# Patient Record
Sex: MALE | Race: WHITE | NOT HISPANIC OR LATINO
[De-identification: names, ages, dates, MRNs, and addresses within clinical notes are randomized per-mention and may not be internally consistent; named-entity substitution may affect disease eponyms.]

---

## 2020-02-06 ENCOUNTER — APPOINTMENT (OUTPATIENT)
Dept: OTOLARYNGOLOGY | Facility: CLINIC | Age: 75
End: 2020-02-06
Payer: MEDICARE

## 2020-02-06 VITALS
DIASTOLIC BLOOD PRESSURE: 75 MMHG | HEIGHT: 66 IN | HEART RATE: 85 BPM | BODY MASS INDEX: 25.23 KG/M2 | SYSTOLIC BLOOD PRESSURE: 144 MMHG | WEIGHT: 157 LBS

## 2020-02-06 DIAGNOSIS — Z82.49 FAMILY HISTORY OF ISCHEMIC HEART DISEASE AND OTHER DISEASES OF THE CIRCULATORY SYSTEM: ICD-10-CM

## 2020-02-06 DIAGNOSIS — Z86.79 PERSONAL HISTORY OF OTHER DISEASES OF THE CIRCULATORY SYSTEM: ICD-10-CM

## 2020-02-06 DIAGNOSIS — Z86.39 PERSONAL HISTORY OF OTHER ENDOCRINE, NUTRITIONAL AND METABOLIC DISEASE: ICD-10-CM

## 2020-02-06 DIAGNOSIS — H61.23 IMPACTED CERUMEN, BILATERAL: ICD-10-CM

## 2020-02-06 DIAGNOSIS — Z80.9 FAMILY HISTORY OF MALIGNANT NEOPLASM, UNSPECIFIED: ICD-10-CM

## 2020-02-06 DIAGNOSIS — Z78.9 OTHER SPECIFIED HEALTH STATUS: ICD-10-CM

## 2020-02-06 PROBLEM — Z00.00 ENCOUNTER FOR PREVENTIVE HEALTH EXAMINATION: Status: ACTIVE | Noted: 2020-02-06

## 2020-02-06 PROCEDURE — 92567 TYMPANOMETRY: CPT

## 2020-02-06 PROCEDURE — 99213 OFFICE O/P EST LOW 20 MIN: CPT | Mod: 25

## 2020-02-06 PROCEDURE — 92557 COMPREHENSIVE HEARING TEST: CPT

## 2020-02-06 PROCEDURE — G0268 REMOVAL OF IMPACTED WAX MD: CPT

## 2020-02-06 RX ORDER — MELOXICAM 7.5 MG/1
7.5 TABLET ORAL
Qty: 14 | Refills: 0 | Status: ACTIVE | COMMUNITY
Start: 2019-12-19

## 2020-02-06 RX ORDER — LOSARTAN POTASSIUM 100 MG/1
100 TABLET, FILM COATED ORAL
Qty: 90 | Refills: 0 | Status: ACTIVE | COMMUNITY
Start: 2019-10-18

## 2020-02-06 RX ORDER — ATORVASTATIN CALCIUM 40 MG/1
40 TABLET, FILM COATED ORAL
Qty: 90 | Refills: 0 | Status: ACTIVE | COMMUNITY
Start: 2019-06-10

## 2020-02-06 RX ORDER — LOSARTAN POTASSIUM 100 MG/1
TABLET, FILM COATED ORAL
Refills: 0 | Status: ACTIVE | COMMUNITY

## 2020-02-06 RX ORDER — FLUTICASONE PROPIONATE 50 UG/1
50 SPRAY, METERED NASAL DAILY
Qty: 1 | Refills: 3 | Status: ACTIVE | COMMUNITY
Start: 2020-02-06 | End: 1900-01-01

## 2020-02-06 RX ORDER — METOPROLOL SUCCINATE 25 MG/1
25 TABLET, EXTENDED RELEASE ORAL
Refills: 0 | Status: ACTIVE | COMMUNITY

## 2020-02-06 RX ORDER — METOPROLOL SUCCINATE 25 MG/1
25 TABLET, EXTENDED RELEASE ORAL
Qty: 90 | Refills: 0 | Status: ACTIVE | COMMUNITY
Start: 2019-11-18

## 2020-02-06 RX ORDER — TRAMADOL HYDROCHLORIDE 50 MG/1
50 TABLET, COATED ORAL
Qty: 15 | Refills: 0 | Status: ACTIVE | COMMUNITY
Start: 2019-12-19

## 2020-02-06 RX ORDER — DUTASTERIDE AND TAMSULOSIN HYDROCHLORIDE .5; .4 MG/1; MG/1
0.5-0.4 CAPSULE ORAL
Qty: 90 | Refills: 0 | Status: ACTIVE | COMMUNITY
Start: 2019-06-26

## 2020-02-06 RX ORDER — LOSARTAN POTASSIUM 50 MG/1
50 TABLET, FILM COATED ORAL
Qty: 90 | Refills: 0 | Status: ACTIVE | COMMUNITY
Start: 2019-12-12

## 2020-02-06 RX ORDER — ATORVASTATIN CALCIUM 80 MG/1
TABLET, FILM COATED ORAL
Refills: 0 | Status: ACTIVE | COMMUNITY

## 2020-02-06 RX ORDER — PANTOPRAZOLE 40 MG/1
40 TABLET, DELAYED RELEASE ORAL
Qty: 14 | Refills: 0 | Status: ACTIVE | COMMUNITY
Start: 2019-12-19

## 2020-02-06 RX ORDER — OXYCODONE AND ACETAMINOPHEN 5; 325 MG/1; MG/1
5-325 TABLET ORAL
Qty: 18 | Refills: 0 | Status: ACTIVE | COMMUNITY
Start: 2019-12-15

## 2020-02-06 NOTE — HISTORY OF PRESENT ILLNESS
[de-identified] : VERONICA FLORES is a 74 year patient Here for left ear fullness, eustachian dysfunction, and hearing loss. He had an upper respiratory tract infection about 3 weeks ago. He woke up with left-sided tinnitus and fullness. He has no otalgia or otorrhea. The cold improved. He has a mild cough. He has a history of a left sensorineural hearing loss which occurred in 2001. He was seeing Dr. Peña at the time. He had an MRI. He has not had a recent hearing test

## 2020-02-06 NOTE — CONSULT LETTER
[Dear  ___] : Dear  [unfilled], [Courtesy Letter:] : I had the pleasure of seeing your patient, [unfilled], in my office today. [Please see my note below.] : Please see my note below. [Consult Closing:] : Thank you very much for allowing me to participate in the care of this patient.  If you have any questions, please do not hesitate to contact me. [Sincerely,] : Sincerely, [FreeTextEntry3] : Reba Coley MD\par

## 2020-02-06 NOTE — ASSESSMENT
[FreeTextEntry1] : He has a long history of an asymmetric left sensorineural hearing loss. He had a recent upper respiratory tract infection. He had ear fullness. This is likely due to eustachian tube dysfunction and cerumen impaction. The ears were cleaned. There was no middle ear effusion.\par \par PLAN\par \par -findings and management options discussed in detail with the patient. \par -good aural hygiene\par -noise precautions\par -annual audiogram\par -consider HAE- he may benefit form a BiCros hearing aid\par -He may try nasal steroid spray and or decongestant. He could also consider a short burst of prednisone. It is unclear if he could have had another sudden sensorineural hearing loss as I do not have a prior audiogram. However, I think that is less likely.\par -follow up in 2 weeks if he is not better.  Otherwise followup in one year if he is doing well\par -call and return earlier if any concerns. \par

## 2020-02-10 ENCOUNTER — APPOINTMENT (OUTPATIENT)
Dept: OTOLARYNGOLOGY | Facility: CLINIC | Age: 75
End: 2020-02-10
Payer: SELF-PAY

## 2020-02-10 PROCEDURE — 92591 HEARING AID EXAMINATION & SELECTION BINAURAL: CPT | Mod: NC

## 2020-02-20 ENCOUNTER — APPOINTMENT (OUTPATIENT)
Dept: OTOLARYNGOLOGY | Facility: CLINIC | Age: 75
End: 2020-02-20
Payer: MEDICARE

## 2020-02-20 ENCOUNTER — APPOINTMENT (OUTPATIENT)
Dept: OTOLARYNGOLOGY | Facility: CLINIC | Age: 75
End: 2020-02-20
Payer: SELF-PAY

## 2020-02-20 VITALS
SYSTOLIC BLOOD PRESSURE: 155 MMHG | BODY MASS INDEX: 25.23 KG/M2 | DIASTOLIC BLOOD PRESSURE: 80 MMHG | HEIGHT: 66 IN | WEIGHT: 157 LBS

## 2020-02-20 DIAGNOSIS — H90.42 SENSORINEURAL HEARING LOSS, UNILATERAL, LEFT EAR, WITH UNRESTRICTED HEARING ON THE CONTRALATERAL SIDE: ICD-10-CM

## 2020-02-20 PROCEDURE — 92592: CPT | Mod: NC

## 2020-02-20 PROCEDURE — 99213 OFFICE O/P EST LOW 20 MIN: CPT

## 2020-02-20 PROCEDURE — 92557 COMPREHENSIVE HEARING TEST: CPT | Mod: LT

## 2020-02-20 PROCEDURE — 92567 TYMPANOMETRY: CPT | Mod: LT

## 2020-02-20 NOTE — HISTORY OF PRESENT ILLNESS
[de-identified] : 2/6/20- VERONICA FLORES is a 74 year patient Here for left ear fullness, eustachian dysfunction, and hearing loss. He had an upper respiratory tract infection about 3 weeks ago. He woke up with left-sided tinnitus and fullness. He has no otalgia or otorrhea. The cold improved. He has a mild cough. He has a history of a left sensorineural hearing loss which occurred in 2001. He was seeing Dr. Peña at the time. He had an MRI. He has not had a recent hearing test\par  [FreeTextEntry1] : \par 2/20/20- VERONICA FLORES Is here for followup for right ear fullness, hearing loss, and sensation of eustachian tube dysfunction. He does feel that his hearing intermittently improves when he presses on the tragus. He tried a nasal steroid spray but is not sure how much it helped. He is seeing the audiologist today.

## 2020-02-20 NOTE — ASSESSMENT
[FreeTextEntry1] : He has bilateral sensorineural hearing loss with asymmetry in the left ear which is unchanged. He intermittently feels better when he pushes on the tragus. He may have mild eustachian tube dysfunction. His ear was normal on exam.\par \par PLAN\par \par -findings and management options discussed in detail with the patient. \par -good aural hygiene\par -noise precautions\par -annual audiogram.  I will see if we can obtain a prior audiogram from his other ENT physician to see if the hearing has changed. Although he does feel it has intermittently improved since his last visit, audiogram is unchanged\par -he is seeing the audiologist today for evaluation\par -nasal steroid spray and or decongestant as needed. He could also consider trying a burst of oral steroids\par -he may consider NE/FL- he deferred today\par -follow up in one year if he is doing well\par -call and return earlier if any concerns. \par

## 2020-04-09 ENCOUNTER — APPOINTMENT (OUTPATIENT)
Dept: OTOLARYNGOLOGY | Facility: CLINIC | Age: 75
End: 2020-04-09

## 2021-08-19 ENCOUNTER — APPOINTMENT (OUTPATIENT)
Dept: OTOLARYNGOLOGY | Facility: CLINIC | Age: 76
End: 2021-08-19
Payer: MEDICARE

## 2021-08-19 DIAGNOSIS — H90.A21 SENSORINEURAL HEARING LOSS, UNILATERAL, RIGHT EAR, WITH RESTRICTED HEARING ON THE CONTRALATERAL SIDE: ICD-10-CM

## 2021-08-19 DIAGNOSIS — H90.A32 MIXED CONDUCTIVE AND SENSORINEURAL HEARING, UNILATERAL, LEFT EAR WITH RESTRICTED HEARING ON THE  CONTRALATERAL SIDE: ICD-10-CM

## 2021-08-19 DIAGNOSIS — H93.12 TINNITUS, LEFT EAR: ICD-10-CM

## 2021-08-19 DIAGNOSIS — H61.21 IMPACTED CERUMEN, RIGHT EAR: ICD-10-CM

## 2021-08-19 PROCEDURE — 31231 NASAL ENDOSCOPY DX: CPT

## 2021-08-19 PROCEDURE — 99214 OFFICE O/P EST MOD 30 MIN: CPT | Mod: 25

## 2021-08-19 PROCEDURE — 92567 TYMPANOMETRY: CPT

## 2021-08-19 PROCEDURE — 69210 REMOVE IMPACTED EAR WAX UNI: CPT

## 2021-08-19 PROCEDURE — 92557 COMPREHENSIVE HEARING TEST: CPT

## 2021-08-19 NOTE — ASSESSMENT
[FreeTextEntry1] : He has a history of bilateral sensorineural hearing loss with asymmetry in the left ear. For the past 10-14 days, he has had left ear pressure. He also had mild dizziness with Valsalva. He has had intermittent dizziness in the past. He had cerumen impaction on the right side which was removed. Nasal endoscopy and flexible laryngoscopy were unremarkable. Repeat audiogram showed bilateral sensorineural hearing loss with asymmetry in the left ear. It may be an mixed hearing loss although in the past it was sensorineural. He has type As tympanogram on that side. His word recognition was worse today in the left ear compared to his last test. I discussed whether or not he had a sudden change in the word recognition.  He does not think that is the case because he feels like he is hearing the same on that side. I discussed the possibility allergies for the ear pressure and dizziness including eustachian tube dysfunction, possible TMJ dysfunction, or inner ear disease such as superior semicircular canal dehiscence.\par \par PLAN\par \par -findings and management options discussed in detail with the patient and his wife\par -good aural hygiene\par --We will monitor his hearing\par -We will again try and obtain his old records.\par -I discussed whether or not he would benefit from a course of oral steroids. I would recommend them if there was a sudden change in the word recognition. He is going to hold off for now because he does not think that is the case.\par -He may try a nasal steroid sprays and decongestant for possible eustachian tube dysfunction\par -Treatment for TMJ dysfunction recommended\par -Repeat MRI of the IACs may be helpful. I have asked him to think this over\par -Followup in approximately 2 weeks. I will repeat his hearing test and see how he is feeling. We will then discuss further workup such as MRI. I would also consider VEMP testing. \par -call and return earlier if any concerns. \par

## 2021-08-19 NOTE — HISTORY OF PRESENT ILLNESS
[de-identified] : VERONICA FLORES is a 76 year patient With a history of bilateral sensorineural hearing loss with asymmetry in the left ear, eustachian tube dysfunction, and TMJ dysfunction. He has had a 10-14 day history of left ear pressure and pain.  He also has a little bit of dizziness when he Valsalva's. He denies otorrhea or tinnitus. He was not sick recently and has no nasal or throat symptoms. He flew recently had no trouble during the flight. 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 He has also had neck discomfort from sleeping on a new pillow\par \par ENT History\par He has had a left SNHLsince 2001. He was seeing Dr. Peña. He reportedly had an MRI. He has intermittent dizziness\par no history of recurrent ear infections, prior otologic surgery, ear/head trauma or noise exposure.

## 2021-09-02 ENCOUNTER — APPOINTMENT (OUTPATIENT)
Dept: OTOLARYNGOLOGY | Facility: CLINIC | Age: 76
End: 2021-09-02
Payer: MEDICARE

## 2021-09-02 VITALS — HEIGHT: 66 IN | TEMPERATURE: 98.5 F | BODY MASS INDEX: 23.63 KG/M2 | WEIGHT: 147 LBS

## 2021-09-02 DIAGNOSIS — H90.3 SENSORINEURAL HEARING LOSS, BILATERAL: ICD-10-CM

## 2021-09-02 DIAGNOSIS — M26.609 UNSPECIFIED TEMPOROMANDIBULAR JOINT DISORDER: ICD-10-CM

## 2021-09-02 DIAGNOSIS — H90.5 UNSPECIFIED SENSORINEURAL HEARING LOSS: ICD-10-CM

## 2021-09-02 DIAGNOSIS — H69.82 OTHER SPECIFIED DISORDERS OF EUSTACHIAN TUBE, LEFT EAR: ICD-10-CM

## 2021-09-02 PROCEDURE — 92567 TYMPANOMETRY: CPT

## 2021-09-02 PROCEDURE — 99214 OFFICE O/P EST MOD 30 MIN: CPT

## 2021-09-02 PROCEDURE — 92557 COMPREHENSIVE HEARING TEST: CPT

## 2021-09-02 RX ORDER — CALCIUM CARBONATE/VITAMIN D3 600 MG-10
TABLET ORAL
Refills: 0 | Status: ACTIVE | COMMUNITY

## 2021-09-02 RX ORDER — CHROMIUM 200 MCG
TABLET ORAL
Refills: 0 | Status: ACTIVE | COMMUNITY

## 2021-09-02 NOTE — HISTORY OF PRESENT ILLNESS
[de-identified] : VERONICA FLORES is a 76 year patient Here for followup for left ear symptoms. He has a history of bilateral sensorineural hearing loss with asymmetry in the left ear, eustachian tube dysfunction, and TMJ dysfunction. He continues to have some fullness in the left ear and hyperacusis.  He thinks it may be due to his neck. He continues to have neck discomfort which he believes is related to sleeping on a new pillow. We had discussed the possibility of a sudden hearing loss as his word recognition on the left side had decreased. He decided against oral steroids. \par \par ENT History\par He has had a left SNHLsince 2001. He was seeing Dr. Peña. He reportedly had an MRI. He has intermittent dizziness\par no history of recurrent ear infections, prior otologic surgery, ear/head trauma or noise exposure.\par NE/FL unremarkable

## 2021-09-02 NOTE — ASSESSMENT
[FreeTextEntry1] : He has bilateral sensorineural hearing loss with asymmetry in the left ear. He has been having left ear pressure and neck/ear discomfort. He has has occasional dizziness with Valsalva. He said he is had the dizziness for a while. There was concern of a decrease in the word recognition on the left side. Repeat testing today with [facility] showed 24% word Recognition which is essentially stable from 2020. However, he has had a decline in the word recognition with time. His ear pressure and discomfort may be due to TMJ dysfunction and musculoskeletal discomfort in his neck. He attributes this to sleeping on a new pillow.  I had discussed other possible etiologies but ear pressure and dizziness such as eustachian tube dysfunction and inner ear disease.\par \par PLAN\par \par -findings and management options discussed in detail with the patient and his wife.  \par -good aural hygiene\par -monitor hearing\par -I again asked him to try to get his prior records. \par - nasal steroid sprays and decongestant for eustachian tube dysfunction as needed. \par -Treatment for TMJ dysfunction recommended. warm compresses/heating pad and oral appliance. \par -I recommended that he see a neurologist for evaluation for cervical disc disease and the musculoskeletal discomfort. They are seeing a new PCP next week. I asked them to speak with her about this\par -I also spoke with him about obtaining imaging studies such as an MRI with contrast of the IACs or CT scan of the neck. He is not sure what he wants to do. I've asked them to think this over and call me if he wants to get one of the scans. \par -Consider further inner ear testing\par -he may consider a Cros hearing aid. \par -I asked him to call me after he sees the PCP and let me know what he would like to do. If he opts for observation, I will see him back in 3 months\par -call and return urgently if any worsening or persistent symptoms.

## 2021-09-07 ENCOUNTER — NON-APPOINTMENT (OUTPATIENT)
Age: 76
End: 2021-09-07

## 2021-09-07 DIAGNOSIS — R42 DIZZINESS AND GIDDINESS: ICD-10-CM

## 2021-09-14 ENCOUNTER — TRANSCRIPTION ENCOUNTER (OUTPATIENT)
Age: 76
End: 2021-09-14

## 2021-09-14 ENCOUNTER — RX RENEWAL (OUTPATIENT)
Age: 76
End: 2021-09-14

## 2021-09-14 RX ORDER — FLUTICASONE PROPIONATE 50 UG/1
50 SPRAY, METERED NASAL DAILY
Qty: 16 | Refills: 11 | Status: ACTIVE | COMMUNITY
Start: 2021-08-24 | End: 1900-01-01

## 2021-09-15 ENCOUNTER — APPOINTMENT (OUTPATIENT)
Dept: MRI IMAGING | Facility: HOSPITAL | Age: 76
End: 2021-09-15
Payer: MEDICARE

## 2021-09-15 ENCOUNTER — OUTPATIENT (OUTPATIENT)
Dept: OUTPATIENT SERVICES | Facility: HOSPITAL | Age: 76
LOS: 1 days | End: 2021-09-15
Payer: MEDICARE

## 2021-09-15 PROCEDURE — A9585: CPT

## 2021-09-15 PROCEDURE — 70553 MRI BRAIN STEM W/O & W/DYE: CPT | Mod: 26,MH

## 2021-09-15 PROCEDURE — 70553 MRI BRAIN STEM W/O & W/DYE: CPT

## 2021-09-20 ENCOUNTER — NON-APPOINTMENT (OUTPATIENT)
Age: 76
End: 2021-09-20

## 2023-03-15 ENCOUNTER — RX RENEWAL (OUTPATIENT)
Age: 78
End: 2023-03-15